# Patient Record
Sex: MALE | Race: WHITE | NOT HISPANIC OR LATINO | Employment: FULL TIME | URBAN - METROPOLITAN AREA
[De-identification: names, ages, dates, MRNs, and addresses within clinical notes are randomized per-mention and may not be internally consistent; named-entity substitution may affect disease eponyms.]

---

## 2022-10-07 ENCOUNTER — TELEMEDICINE (OUTPATIENT)
Dept: FAMILY MEDICINE CLINIC | Facility: TELEHEALTH | Age: 42
End: 2022-10-07

## 2022-10-07 DIAGNOSIS — U07.1 COVID-19: Primary | ICD-10-CM

## 2022-10-07 PROCEDURE — BRIGHTMDVISIT: Performed by: NURSE PRACTITIONER

## 2022-10-07 RX ORDER — LIDOCAINE HYDROCHLORIDE 20 MG/ML
10 SOLUTION OROPHARYNGEAL AS NEEDED
Qty: 100 ML | Refills: 0 | Status: SHIPPED | OUTPATIENT
Start: 2022-10-07 | End: 2022-10-07 | Stop reason: SDUPTHER

## 2022-10-07 RX ORDER — LIDOCAINE HYDROCHLORIDE 20 MG/ML
10 SOLUTION OROPHARYNGEAL 4 TIMES DAILY PRN
Qty: 100 ML | Refills: 0 | Status: SHIPPED | OUTPATIENT
Start: 2022-10-07

## 2022-10-07 RX ORDER — PREDNISONE 20 MG/1
20 TABLET ORAL DAILY
Qty: 3 TABLET | Refills: 0 | Status: SHIPPED | OUTPATIENT
Start: 2022-10-07 | End: 2022-10-10

## 2022-10-07 NOTE — PROGRESS NOTES
CHIEF COMPLAINT  Chief Complaint   Patient presents with   • Sore Throat         HPI  Justin San is a 42 y.o. male  presents with complaint of sore throat. He has gotten worse over the last several days. 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   He has a sore throat since Monday. He did have low grade fever.   I have ask him to take an at home COVID-19 test before treatment.     Review of Systems   Constitutional: Positive for fever (100.5, but typically ). Negative for chills, diaphoresis and fatigue.   HENT: Positive for sore throat. Negative for congestion, postnasal drip, rhinorrhea, sinus pressure, sinus pain and sneezing.    Respiratory: Negative for cough, chest tightness, shortness of breath and wheezing.    Cardiovascular: Negative for chest pain.   Gastrointestinal: Negative for diarrhea, nausea and vomiting.   Musculoskeletal: Positive for myalgias.   Neurological: Negative for headaches.       History reviewed. No pertinent past medical history.    No family history on file.    Social History     Socioeconomic History   • Marital status:    Tobacco Use   • Smoking status: Never Smoker   • Smokeless tobacco: Never Used   Vaping Use   • Vaping Use: Never used       Justin San  reports that he has never smoked. He has never used smokeless tobacco.       There were no vitals taken for this visit.    PHYSICAL EXAM  Physical Exam   Constitutional: He is oriented to person, place, and time. He appears well-developed and well-nourished. He does not have a sickly appearance. He does not appear ill. No distress.   HENT:   Head: Normocephalic and atraumatic.   Very red, but no white pustules.    Eyes: EOM are normal.   Pulmonary/Chest: Effort normal.  No respiratory distress.  Lymphadenopathy:     He has cervical adenopathy (per patient report ).   Neurological: He is alert and oriented to person, place, and time.   Skin: Skin is dry.   Psychiatric: He has a normal mood and  affect.         Diagnoses and all orders for this visit:    1. COVID-19 (Primary)  -     QUESTIONNAIRE SERIES    Other orders  -     Lidocaine Viscous HCl (XYLOCAINE) 2 % solution; Take 10 mL by mouth As Needed for Mild Pain.  Dispense: 100 mL; Refill: 0  -     predniSONE (DELTASONE) 20 MG tablet; Take 1 tablet by mouth Daily for 3 days.  Dispense: 3 tablet; Refill: 0    COVID-19 test positive.     The use of a video visit has been reviewed with the patient and verbal informd consent has een obtained. Myself and Justin San participated in this visit. The patient is located in [address] [place] [address]. I am located in [place], [place]. Mychart and Zoom were utilized.       Note Disclaimer: At [facility], we believe that sharing information builds trust and better   relationships. You are receiving this note because you recently visited [facility]. It is possible you   will see health information before a provider has talked with you about it. This kind of information can   be easy to misunderstand. To help you fully understand what it means for your health, we urge you to   discuss this note with your provider.    Manju Reynoso, UZIEL  10/07/2022  10:40 EDT

## 2022-10-07 NOTE — PATIENT INSTRUCTIONS
You will need to remain quarantined for 10 days from onset of symptoms and only to discontinue if symptoms have improved and no fever for 24 hours without the use of fever reducing medications such as Tylenol (acetaminophen), Advil (ibuprfen), or Aleve (naproxen).     You may discontinue after 5 days if your symptoms have resolved and you have no fever for 24 hours without the use of fever reducing medications such as Tylenol (acetaminophen), Advil (ibuprfen), or Aleve (naproxen).   Continue to wear a mask for 10 days or until symptoms resolve. If symptoms worsen, go to Urgent Care or the Emergency Department for care.     Symptoms of Coronavirus are treated just as you would for any viral illness. Take Tylenol and/or Ibuprofen for pain and/or fever, you may find it better to alternate these every 4 hours, so that you are only taking each every 8 hours. Stay hydrated, rest and you may treat cough with over-the-counter cough syrup such as Robitussin.  If your symptoms do not improve, symptoms change or do not fully resolve, seek further evaluation with your primary care provider or Urgent Care.     If you develop severe symptoms, such as chest pain, high fever, difficulty breathing, difficulty urinating, confusion, difficulty staying awake, blue or pale lips or have any symptoms that interfere with your ability to function go to the nearest Emergency Department immediately.

## 2024-08-23 ENCOUNTER — HOSPITAL ENCOUNTER (INPATIENT)
Facility: HOSPITAL | Age: 44
LOS: 3 days | Discharge: HOME | End: 2024-08-26
Attending: EMERGENCY MEDICINE | Admitting: STUDENT IN AN ORGANIZED HEALTH CARE EDUCATION/TRAINING PROGRAM

## 2024-08-23 DIAGNOSIS — E87.1 HYPONATREMIA: Primary | ICD-10-CM

## 2024-08-23 DIAGNOSIS — M62.82 NON-TRAUMATIC RHABDOMYOLYSIS: ICD-10-CM

## 2024-08-23 LAB
ALBUMIN SERPL-MCNC: 4.9 G/DL (ref 3.5–5.2)
ALBUMIN/GLOB SERPL: 2.3 G/DL
ALP SERPL-CCNC: 58 U/L (ref 39–117)
ALT SERPL W P-5'-P-CCNC: 23 U/L (ref 1–41)
ANION GAP SERPL CALCULATED.3IONS-SCNC: 17.5 MMOL/L (ref 5–15)
AST SERPL-CCNC: 59 U/L (ref 1–40)
BASOPHILS # BLD AUTO: 0.02 10*3/MM3 (ref 0–0.2)
BASOPHILS NFR BLD AUTO: 0.1 % (ref 0–1.5)
BILIRUB SERPL-MCNC: 1.3 MG/DL (ref 0–1.2)
BUN SERPL-MCNC: 2 MG/DL (ref 6–20)
BUN/CREAT SERPL: 2.1 (ref 7–25)
CALCIUM SPEC-SCNC: 8.9 MG/DL (ref 8.6–10.5)
CHLORIDE SERPL-SCNC: 84 MMOL/L (ref 98–107)
CK SERPL-CCNC: 2906 U/L (ref 20–200)
CO2 SERPL-SCNC: 16.5 MMOL/L (ref 22–29)
CREAT SERPL-MCNC: 0.94 MG/DL (ref 0.76–1.27)
DEPRECATED RDW RBC AUTO: 40.6 FL (ref 37–54)
EGFRCR SERPLBLD CKD-EPI 2021: 103.8 ML/MIN/1.73
EOSINOPHIL # BLD AUTO: 0.01 10*3/MM3 (ref 0–0.4)
EOSINOPHIL NFR BLD AUTO: 0.1 % (ref 0.3–6.2)
ERYTHROCYTE [DISTWIDTH] IN BLOOD BY AUTOMATED COUNT: 12.4 % (ref 12.3–15.4)
GLOBULIN UR ELPH-MCNC: 2.1 GM/DL
GLUCOSE SERPL-MCNC: 156 MG/DL (ref 65–99)
HCT VFR BLD AUTO: 41.1 % (ref 37.5–51)
HGB BLD-MCNC: 14 G/DL (ref 13–17.7)
IMM GRANULOCYTES # BLD AUTO: 0.07 10*3/MM3 (ref 0–0.05)
IMM GRANULOCYTES NFR BLD AUTO: 0.4 % (ref 0–0.5)
LYMPHOCYTES # BLD AUTO: 0.6 10*3/MM3 (ref 0.7–3.1)
LYMPHOCYTES NFR BLD AUTO: 3.9 % (ref 19.6–45.3)
MCH RBC QN AUTO: 30.8 PG (ref 26.6–33)
MCHC RBC AUTO-ENTMCNC: 34.1 G/DL (ref 31.5–35.7)
MCV RBC AUTO: 90.5 FL (ref 79–97)
MONOCYTES # BLD AUTO: 0.6 10*3/MM3 (ref 0.1–0.9)
MONOCYTES NFR BLD AUTO: 3.9 % (ref 5–12)
NEUTROPHILS NFR BLD AUTO: 14.28 10*3/MM3 (ref 1.7–7)
NEUTROPHILS NFR BLD AUTO: 91.6 % (ref 42.7–76)
NRBC BLD AUTO-RTO: 0 /100 WBC (ref 0–0.2)
PLATELET # BLD AUTO: 189 10*3/MM3 (ref 140–450)
PMV BLD AUTO: 10.2 FL (ref 6–12)
POTASSIUM SERPL-SCNC: 4 MMOL/L (ref 3.5–5.2)
PROT SERPL-MCNC: 7 G/DL (ref 6–8.5)
RBC # BLD AUTO: 4.54 10*6/MM3 (ref 4.14–5.8)
SODIUM SERPL-SCNC: 118 MMOL/L (ref 136–145)
WBC NRBC COR # BLD AUTO: 15.58 10*3/MM3 (ref 3.4–10.8)

## 2024-08-23 PROCEDURE — 25810000003 SODIUM CHLORIDE 0.9 % SOLUTION: Performed by: EMERGENCY MEDICINE

## 2024-08-23 PROCEDURE — 93005 ELECTROCARDIOGRAM TRACING: CPT | Performed by: EMERGENCY MEDICINE

## 2024-08-23 PROCEDURE — 85025 COMPLETE CBC W/AUTO DIFF WBC: CPT | Performed by: EMERGENCY MEDICINE

## 2024-08-23 PROCEDURE — 82550 ASSAY OF CK (CPK): CPT | Performed by: EMERGENCY MEDICINE

## 2024-08-23 PROCEDURE — 99291 CRITICAL CARE FIRST HOUR: CPT

## 2024-08-23 PROCEDURE — 93010 ELECTROCARDIOGRAM REPORT: CPT | Performed by: INTERNAL MEDICINE

## 2024-08-23 PROCEDURE — 80053 COMPREHEN METABOLIC PANEL: CPT | Performed by: EMERGENCY MEDICINE

## 2024-08-23 RX ORDER — SODIUM CHLORIDE 0.9 % (FLUSH) 0.9 %
10 SYRINGE (ML) INJECTION AS NEEDED
Status: DISCONTINUED | OUTPATIENT
Start: 2024-08-23 | End: 2024-08-26 | Stop reason: HOSPADM

## 2024-08-23 RX ORDER — ONDANSETRON 2 MG/ML
4 INJECTION INTRAMUSCULAR; INTRAVENOUS EVERY 6 HOURS PRN
Status: DISCONTINUED | OUTPATIENT
Start: 2024-08-23 | End: 2024-08-26 | Stop reason: HOSPADM

## 2024-08-23 RX ORDER — AMOXICILLIN 250 MG
2 CAPSULE ORAL 2 TIMES DAILY PRN
Status: DISCONTINUED | OUTPATIENT
Start: 2024-08-23 | End: 2024-08-26 | Stop reason: HOSPADM

## 2024-08-23 RX ORDER — BISACODYL 10 MG
10 SUPPOSITORY, RECTAL RECTAL DAILY PRN
Status: DISCONTINUED | OUTPATIENT
Start: 2024-08-23 | End: 2024-08-26 | Stop reason: HOSPADM

## 2024-08-23 RX ORDER — BISACODYL 5 MG/1
5 TABLET, DELAYED RELEASE ORAL DAILY PRN
Status: DISCONTINUED | OUTPATIENT
Start: 2024-08-23 | End: 2024-08-26 | Stop reason: HOSPADM

## 2024-08-23 RX ORDER — ACETAMINOPHEN 160 MG/5ML
650 SOLUTION ORAL EVERY 4 HOURS PRN
Status: DISCONTINUED | OUTPATIENT
Start: 2024-08-23 | End: 2024-08-26 | Stop reason: HOSPADM

## 2024-08-23 RX ORDER — ACETAMINOPHEN 650 MG/1
650 SUPPOSITORY RECTAL EVERY 4 HOURS PRN
Status: DISCONTINUED | OUTPATIENT
Start: 2024-08-23 | End: 2024-08-26 | Stop reason: HOSPADM

## 2024-08-23 RX ORDER — POLYETHYLENE GLYCOL 3350 17 G/17G
17 POWDER, FOR SOLUTION ORAL DAILY PRN
Status: DISCONTINUED | OUTPATIENT
Start: 2024-08-23 | End: 2024-08-26 | Stop reason: HOSPADM

## 2024-08-23 RX ORDER — ONDANSETRON 4 MG/1
4 TABLET, ORALLY DISINTEGRATING ORAL EVERY 6 HOURS PRN
Status: DISCONTINUED | OUTPATIENT
Start: 2024-08-23 | End: 2024-08-26 | Stop reason: HOSPADM

## 2024-08-23 RX ORDER — ALUMINA, MAGNESIA, AND SIMETHICONE 2400; 2400; 240 MG/30ML; MG/30ML; MG/30ML
15 SUSPENSION ORAL EVERY 6 HOURS PRN
Status: DISCONTINUED | OUTPATIENT
Start: 2024-08-23 | End: 2024-08-26 | Stop reason: HOSPADM

## 2024-08-23 RX ORDER — NITROGLYCERIN 0.4 MG/1
0.4 TABLET SUBLINGUAL
Status: DISCONTINUED | OUTPATIENT
Start: 2024-08-23 | End: 2024-08-26 | Stop reason: HOSPADM

## 2024-08-23 RX ORDER — ACETAMINOPHEN 325 MG/1
650 TABLET ORAL EVERY 4 HOURS PRN
Status: DISCONTINUED | OUTPATIENT
Start: 2024-08-23 | End: 2024-08-26 | Stop reason: HOSPADM

## 2024-08-23 RX ADMIN — SODIUM CHLORIDE 1000 ML: 9 INJECTION, SOLUTION INTRAVENOUS at 22:05

## 2024-08-24 PROBLEM — R73.9 HYPERGLYCEMIA: Status: ACTIVE | Noted: 2024-08-24

## 2024-08-24 PROBLEM — M79.10 MYALGIA: Status: ACTIVE | Noted: 2024-08-24

## 2024-08-24 PROBLEM — M62.82 RHABDOMYOLYSIS: Status: ACTIVE | Noted: 2024-08-24

## 2024-08-24 PROBLEM — D64.9 ANEMIA: Status: ACTIVE | Noted: 2024-08-24

## 2024-08-24 PROBLEM — R74.8 ELEVATED CK: Status: ACTIVE | Noted: 2024-08-24

## 2024-08-24 PROBLEM — E86.0 DEHYDRATION: Status: ACTIVE | Noted: 2024-08-24

## 2024-08-24 PROBLEM — D72.829 LEUKOCYTOSIS: Status: ACTIVE | Noted: 2024-08-24

## 2024-08-24 PROBLEM — R53.1 GENERALIZED WEAKNESS: Status: ACTIVE | Noted: 2024-08-24

## 2024-08-24 LAB
ANION GAP SERPL CALCULATED.3IONS-SCNC: 11 MMOL/L (ref 5–15)
ANION GAP SERPL CALCULATED.3IONS-SCNC: 12.3 MMOL/L (ref 5–15)
ANION GAP SERPL CALCULATED.3IONS-SCNC: 6.4 MMOL/L (ref 5–15)
BILIRUB UR QL STRIP: NEGATIVE
BUN SERPL-MCNC: 10 MG/DL (ref 6–20)
BUN SERPL-MCNC: 11 MG/DL (ref 6–20)
BUN SERPL-MCNC: 12 MG/DL (ref 6–20)
BUN/CREAT SERPL: 13.5 (ref 7–25)
BUN/CREAT SERPL: 15 (ref 7–25)
BUN/CREAT SERPL: 15.5 (ref 7–25)
CALCIUM SPEC-SCNC: 8.4 MG/DL (ref 8.6–10.5)
CALCIUM SPEC-SCNC: 8.9 MG/DL (ref 8.6–10.5)
CALCIUM SPEC-SCNC: 9 MG/DL (ref 8.6–10.5)
CHLORIDE SERPL-SCNC: 106 MMOL/L (ref 98–107)
CHLORIDE SERPL-SCNC: 90 MMOL/L (ref 98–107)
CHLORIDE SERPL-SCNC: 99 MMOL/L (ref 98–107)
CK SERPL-CCNC: 3157 U/L (ref 20–200)
CLARITY UR: CLEAR
CO2 SERPL-SCNC: 18.7 MMOL/L (ref 22–29)
CO2 SERPL-SCNC: 21 MMOL/L (ref 22–29)
CO2 SERPL-SCNC: 22.6 MMOL/L (ref 22–29)
COLOR UR: YELLOW
CREAT SERPL-MCNC: 0.71 MG/DL (ref 0.76–1.27)
CREAT SERPL-MCNC: 0.74 MG/DL (ref 0.76–1.27)
CREAT SERPL-MCNC: 0.8 MG/DL (ref 0.76–1.27)
CREAT UR-MCNC: 40.7 MG/DL
D-LACTATE SERPL-SCNC: 0.8 MMOL/L (ref 0.5–2)
DEPRECATED RDW RBC AUTO: 39.3 FL (ref 37–54)
EGFRCR SERPLBLD CKD-EPI 2021: 113.3 ML/MIN/1.73
EGFRCR SERPLBLD CKD-EPI 2021: 116 ML/MIN/1.73
EGFRCR SERPLBLD CKD-EPI 2021: 117.5 ML/MIN/1.73
ERYTHROCYTE [DISTWIDTH] IN BLOOD BY AUTOMATED COUNT: 12.3 % (ref 12.3–15.4)
GLUCOSE SERPL-MCNC: 103 MG/DL (ref 65–99)
GLUCOSE SERPL-MCNC: 106 MG/DL (ref 65–99)
GLUCOSE SERPL-MCNC: 108 MG/DL (ref 65–99)
GLUCOSE UR STRIP-MCNC: NEGATIVE MG/DL
HCT VFR BLD AUTO: 36.3 % (ref 37.5–51)
HGB BLD-MCNC: 12.5 G/DL (ref 13–17.7)
HGB UR QL STRIP.AUTO: NEGATIVE
KETONES UR QL STRIP: ABNORMAL
LEUKOCYTE ESTERASE UR QL STRIP.AUTO: NEGATIVE
MCH RBC QN AUTO: 30.3 PG (ref 26.6–33)
MCHC RBC AUTO-ENTMCNC: 34.4 G/DL (ref 31.5–35.7)
MCV RBC AUTO: 87.9 FL (ref 79–97)
NITRITE UR QL STRIP: NEGATIVE
OSMOLALITY SERPL: 249 MOSM/KG (ref 275–300)
OSMOLALITY UR: 348 MOSM/KG
PH UR STRIP.AUTO: 6 [PH] (ref 5–8)
PLATELET # BLD AUTO: 168 10*3/MM3 (ref 140–450)
PMV BLD AUTO: 10.1 FL (ref 6–12)
POTASSIUM SERPL-SCNC: 3.9 MMOL/L (ref 3.5–5.2)
POTASSIUM SERPL-SCNC: 4.2 MMOL/L (ref 3.5–5.2)
POTASSIUM SERPL-SCNC: 4.5 MMOL/L (ref 3.5–5.2)
PROT UR QL STRIP: NEGATIVE
QT INTERVAL: 347 MS
QT INTERVAL: 403 MS
QTC INTERVAL: 434 MS
QTC INTERVAL: 457 MS
RBC # BLD AUTO: 4.13 10*6/MM3 (ref 4.14–5.8)
SODIUM SERPL-SCNC: 121 MMOL/L (ref 136–145)
SODIUM SERPL-SCNC: 131 MMOL/L (ref 136–145)
SODIUM SERPL-SCNC: 135 MMOL/L (ref 136–145)
SODIUM UR-SCNC: 36 MMOL/L
SP GR UR STRIP: 1.01 (ref 1–1.03)
TSH SERPL DL<=0.05 MIU/L-ACNC: 1.35 UIU/ML (ref 0.27–4.2)
URATE SERPL-MCNC: 5.3 MG/DL (ref 3.4–7)
UROBILINOGEN UR QL STRIP: ABNORMAL
WBC NRBC COR # BLD AUTO: 10.07 10*3/MM3 (ref 3.4–10.8)

## 2024-08-24 PROCEDURE — 85027 COMPLETE CBC AUTOMATED: CPT | Performed by: NURSE PRACTITIONER

## 2024-08-24 PROCEDURE — 83935 ASSAY OF URINE OSMOLALITY: CPT | Performed by: NURSE PRACTITIONER

## 2024-08-24 PROCEDURE — 0 DEXTROSE 5 % SOLUTION: Performed by: INTERNAL MEDICINE

## 2024-08-24 PROCEDURE — 83605 ASSAY OF LACTIC ACID: CPT | Performed by: INTERNAL MEDICINE

## 2024-08-24 PROCEDURE — 97161 PT EVAL LOW COMPLEX 20 MIN: CPT

## 2024-08-24 PROCEDURE — 25810000003 SODIUM CHLORIDE 0.9 % SOLUTION: Performed by: NURSE PRACTITIONER

## 2024-08-24 PROCEDURE — 80048 BASIC METABOLIC PNL TOTAL CA: CPT | Performed by: INTERNAL MEDICINE

## 2024-08-24 PROCEDURE — 83930 ASSAY OF BLOOD OSMOLALITY: CPT | Performed by: NURSE PRACTITIONER

## 2024-08-24 PROCEDURE — 36415 COLL VENOUS BLD VENIPUNCTURE: CPT | Performed by: NURSE PRACTITIONER

## 2024-08-24 PROCEDURE — 93010 ELECTROCARDIOGRAM REPORT: CPT | Performed by: INTERNAL MEDICINE

## 2024-08-24 PROCEDURE — 25810000003 SODIUM CHLORIDE 0.9 % SOLUTION: Performed by: INTERNAL MEDICINE

## 2024-08-24 PROCEDURE — 81003 URINALYSIS AUTO W/O SCOPE: CPT | Performed by: NURSE PRACTITIONER

## 2024-08-24 PROCEDURE — 82550 ASSAY OF CK (CPK): CPT | Performed by: NURSE PRACTITIONER

## 2024-08-24 PROCEDURE — 84300 ASSAY OF URINE SODIUM: CPT | Performed by: NURSE PRACTITIONER

## 2024-08-24 PROCEDURE — 84443 ASSAY THYROID STIM HORMONE: CPT | Performed by: NURSE PRACTITIONER

## 2024-08-24 PROCEDURE — 80048 BASIC METABOLIC PNL TOTAL CA: CPT | Performed by: NURSE PRACTITIONER

## 2024-08-24 PROCEDURE — 84550 ASSAY OF BLOOD/URIC ACID: CPT | Performed by: NURSE PRACTITIONER

## 2024-08-24 PROCEDURE — 82570 ASSAY OF URINE CREATININE: CPT | Performed by: NURSE PRACTITIONER

## 2024-08-24 PROCEDURE — 93005 ELECTROCARDIOGRAM TRACING: CPT | Performed by: STUDENT IN AN ORGANIZED HEALTH CARE EDUCATION/TRAINING PROGRAM

## 2024-08-24 RX ORDER — DEXTROSE MONOHYDRATE 50 MG/ML
150 INJECTION, SOLUTION INTRAVENOUS CONTINUOUS
Status: DISCONTINUED | OUTPATIENT
Start: 2024-08-24 | End: 2024-08-25

## 2024-08-24 RX ORDER — SODIUM CHLORIDE 9 MG/ML
125 INJECTION, SOLUTION INTRAVENOUS CONTINUOUS
Status: DISCONTINUED | OUTPATIENT
Start: 2024-08-24 | End: 2024-08-24

## 2024-08-24 RX ORDER — DESMOPRESSIN ACETATE 4 UG/ML
2 INJECTION, SOLUTION INTRAVENOUS; SUBCUTANEOUS ONCE
Status: COMPLETED | OUTPATIENT
Start: 2024-08-25 | End: 2024-08-25

## 2024-08-24 RX ADMIN — DEXTROSE MONOHYDRATE 150 ML/HR: 50 INJECTION, SOLUTION INTRAVENOUS at 18:38

## 2024-08-24 RX ADMIN — ACETAMINOPHEN 325MG 650 MG: 325 TABLET ORAL at 17:22

## 2024-08-24 RX ADMIN — SODIUM CHLORIDE 125 ML/HR: 9 INJECTION, SOLUTION INTRAVENOUS at 17:19

## 2024-08-24 RX ADMIN — SODIUM CHLORIDE 75 ML/HR: 9 INJECTION, SOLUTION INTRAVENOUS at 06:05

## 2024-08-24 NOTE — CONSULTS
[facility] Consult Note      Patient Name: Justin San  : 1980  MRN: [id number]  Primary Care Physician:  Provider, No Known  Referring Physician: Alvin HUBBARD MD  Date of admission: 2024    Subjective     Reason for Consult:  hyponatremia    HPI:   Justin San is a 44 y.o. male with presented last night with gen weakness, fatigue, myalgias.  He spent much of yesterday working outside in yard (was shoveling large amounts of dirt around for about 4 hours).  Became lightheaded.  No nv or diarrhea . Found to be severely hyponatremic with Na 118 (at 10PM).  Also acidotic with bicarb 16, AG 18.  Cr normal 0.9 mg/dL but BUN strikingly low (2).  CK elevated ~ 3K.  Urine sodium was 36 and urine osm 348.  WBC elevated to 15K.  UA showed ketonuria, but no e/o myoglobinuria.  BG mildly elevated 156.  TSH and uric acid normal (latter 5.3).  With IVF, Na up slightly 121, though CK up slightly 3157.  AST minimally elevated 59.  Normotensive thus far.  Does not take meds at home.  He apparently was going inside frequently to drink a glass of water while working.  Had some nausea but no emesis or diarrhea.      Review of Systems:   14 point review of systems is otherwise negative except for mentioned above on HPI    Personal History     History reviewed. No pertinent past medical history.    Past Surgical History:   Procedure Laterality Date    TONSILLECTOMY         Family History: family history is not on file.    Social History:  reports that he has never smoked. He has never used smokeless tobacco. He reports that he does not drink alcohol and does not use drugs.    Home Medications:  Prior to Admission medications    Medication Sig Start Date End Date Taking? Authorizing Provider   Lidocaine Viscous HCl (XYLOCAINE) 2 % solution Take 10 mL by mouth 4 (Four) Times a Day As Needed for Mild Pain. 10/7/22   Manju Reynoso APRN       Allergies:  No Known  Allergies    Objective     Vitals:   Temp:  [97.7 °F (36.5 °C)-99 °F (37.2 °C)] 98.2 °F (36.8 °C)  Heart Rate:  [] 73  Resp:  [16-18] 16  BP: (109-128)/(67-77) 125/76  No intake or output data in the 24 hours ending 08/24/24 1323    Physical Exam:    General Appearance: alert, oriented x 3, no acute distress   Skin: warm and dry  HEENT: oral mucosa normal, nonicteric sclera  Neck: supple, no JVD  Lungs: CTA  Heart: RRR, normal S1 and S2  Abdomen: soft, nontender, nondistended  : no palpable bladder  Extremities: no edema, cyanosis or clubbing  Neuro: normal speech and mental status     Scheduled Meds:        IV Meds:   sodium chloride, 150 mL/hr, Last Rate: 75 mL/hr (08/24/24 0605)        Results Reviewed:   I have personally reviewed the results from the time of this admission to 8/24/2024 13:23 EDT     Lab Results   Component Value Date    GLUCOSE 106 (H) 08/24/2024    CALCIUM 8.4 (L) 08/24/2024     (L) 08/24/2024    K 3.9 08/24/2024    CO2 18.7 (L) 08/24/2024    CL 90 (L) 08/24/2024    BUN 11 08/24/2024    CREATININE 0.71 (L) 08/24/2024    BCR 15.5 08/24/2024    ANIONGAP 12.3 08/24/2024      Lab Results   Component Value Date    ALBUMIN 4.9 08/23/2024           Assessment / Plan     ASSESSMENT:  Hyponatremia - likely hypovolemic (but at same time was drinking large amounts of water while working outside in heat).  Na 118 -> 121 over 8h with IVF.  Need to make sure we're not over-correcting this, aim to keep Na < 126 by 10PM tonight.  Urine studies somewhat indeterminate ie Na 36 and osm 348 (urine Na not low as expected).    Elevated CK - moderate (~ 3K), related to intense yard work; renal fcn preserved (though BUN oddly low 2 initially -> 11 now)  AG metabolic acidosis - ketonuria due to vol depletion; bicarb 19 still but AG has closed   Leukocytosis - likely reactive, WBC improved 15 to 10K  Hyperglycemia, mild,  -> 106 now    PLAN:  Recheck BMP stat to assure not over correcting Na and  adjust IVF accordingly to keep Na < 126 by 10PM   Repeat BMP again ~ 8 PM  Check lactic acid   Trend CK  D/W RN    Thank you for involving us in the care of Justin San.  Please feel free to call with any questions.    Eleazar Franks MD  08/24/24  13:23 EDT    [facility]  [phone]

## 2024-08-24 NOTE — ED NOTES
"Nursing report ED to floor  Justin San  44 y.o.  male    HPI :  HPI (Adult)  Stated Reason for Visit: heat exposure, gen weakness, headache  History Obtained From: patient    Chief Complaint  Chief Complaint   Patient presents with    Weakness - Generalized    Headache    Heat Exposure       Admitting doctor:   Charly Black MD    Admitting diagnosis:   The primary encounter diagnosis was Hyponatremia. A diagnosis of Non-traumatic rhabdomyolysis was also pertinent to this visit.    Code status:   Current Code Status       Date Active Code Status Order ID Comments User Context       8/23/2024 2350 CPR (Attempt to Resuscitate) [id number]  Lucia Acosta, UZIEL ED        Question Answer    Code Status (Patient has no pulse and is not breathing) CPR (Attempt to Resuscitate)    Medical Interventions (Patient has pulse or is breathing) Full Support                    Allergies:   Patient has no known allergies.    Isolation:   No active isolations    Intake and Output  No intake or output data in the 24 hours ending 08/24/24 0029    Weight:       08/23/24 2109   Weight: 65.8 kg (145 lb)       Most recent vitals:   Vitals:    08/23/24 2109 08/23/24 2111 08/23/24 2153 08/23/24 2203   BP:  123/77 117/72 128/73   BP Location:  Right arm  Right arm   Patient Position:  Sitting  Lying   Pulse: 108  90 95   Resp: 16   18   Temp: 97.7 °F (36.5 °C)      SpO2: 100%  100% 100%   Weight: 65.8 kg (145 lb)      Height: 170.2 cm (67\")          Active LDAs/IV Access:   Lines, Drains & Airways       Active LDAs       Name Placement date Placement time Site Days    Peripheral IV 08/23/24 2205 Left Antecubital 08/23/24 2205  Antecubital  less than 1                    Labs (abnormal labs have a star):   Labs Reviewed   COMPREHENSIVE METABOLIC PANEL - Abnormal; Notable for the following components:       Result Value    Glucose 156 (*)     BUN 2 (*)     Sodium 118 (*)     Chloride 84 (*)     CO2 16.5 (*)     AST " (SGOT) 59 (*)     Total Bilirubin 1.3 (*)     BUN/Creatinine Ratio 2.1 (*)     Anion Gap 17.5 (*)     All other components within normal limits    Narrative:     GFR Normal >60  Chronic Kidney Disease <60  Kidney Failure <15     CK - Abnormal; Notable for the following components:    Creatine Kinase 2,906 (*)     All other components within normal limits   CBC WITH AUTO DIFFERENTIAL - Abnormal; Notable for the following components:    WBC 15.58 (*)     Neutrophil % 91.6 (*)     Lymphocyte % 3.9 (*)     Monocyte % 3.9 (*)     Eosinophil % 0.1 (*)     Neutrophils, Absolute 14.28 (*)     Lymphocytes, Absolute 0.60 (*)     Immature Grans, Absolute 0.07 (*)     All other components within normal limits   URINALYSIS W/ MICROSCOPIC IF INDICATED (NO CULTURE)   SODIUM, URINE, RANDOM   CREATININE URINE RANDOM (KIDNEY FUNCTION) GFR COMPONENT   OSMOLALITY, URINE   OSMOLALITY, SERUM   URIC ACID   TSH   CBC AND DIFFERENTIAL    Narrative:     The following orders were created for panel order CBC & Differential.  Procedure                               Abnormality         Status                     ---------                               -----------         ------                     CBC Auto Differential[[id number]]        Abnormal            Final result                 Please view results for these tests on the individual orders.       EKG:   ECG 12 Lead Syncope   Preliminary Result   HEART RATE=94  bpm   RR Odwizont=782  ms   OR Interval=  ms   P Horizontal Axis=  deg   P Front Axis=  deg   QRSD Interval=85  ms   QT Wpnpjehu=140  ms   XTbU=680  ms   QRS Axis=73  deg   T Wave Axis=40  deg   - ABNORMAL ECG -   Atrial flutter with varied AV block,   RSR' in V1 or V2, right VCD or RVH   Date and Time of Study:2024-08-23 22:08:05          Meds given in ED:   Medications   sodium chloride 0.9 % flush 10 mL (has no administration in time range)   nitroglycerin (NITROSTAT) SL tablet 0.4 mg (has no administration in time range)    acetaminophen (TYLENOL) tablet 650 mg (has no administration in time range)     Or   acetaminophen (TYLENOL) 160 MG/5ML oral solution 650 mg (has no administration in time range)     Or   acetaminophen (TYLENOL) suppository 650 mg (has no administration in time range)   sennosides-docusate (PERICOLACE) 8.6-50 MG per tablet 2 tablet (has no administration in time range)     And   polyethylene glycol (MIRALAX) packet 17 g (has no administration in time range)     And   bisacodyl (DULCOLAX) EC tablet 5 mg (has no administration in time range)     And   bisacodyl (DULCOLAX) suppository 10 mg (has no administration in time range)   ondansetron ODT (ZOFRAN-ODT) disintegrating tablet 4 mg (has no administration in time range)     Or   ondansetron (ZOFRAN) injection 4 mg (has no administration in time range)   aluminum-magnesium hydroxide-simethicone (MAALOX MAX) 400-400-40 MG/5ML suspension 15 mL (has no administration in time range)   sodium chloride 0.9 % bolus 1,000 mL (1,000 mL Intravenous New Bag 8/23/24 3993)       Imaging results:  No radiology results for the last day    Ambulatory status:   - standby     Social issues:   Social History     Socioeconomic History    Marital status:    Tobacco Use    Smoking status: Never    Smokeless tobacco: Never   Vaping Use    Vaping status: Never Used       Peripheral Neurovascular  Peripheral Neurovascular (Adult)  Peripheral Neurovascular WDL: .WDL except, neurovascular assessment upper, neurovascular assessment lower  LUE Neurovascular Assessment  Temperature LUE: cool  Color LUE: no discoloration  Sensation LUE: tingling present, no numbness, no tenderness  RUE Neurovascular Assessment  Temperature RUE: cool  Color RUE: no discoloration  Sensation RUE: tingling present, no numbness, no tenderness  LLE Neurovascular Assessment  Temperature LLE: cool  Color LLE: no discoloration  Sensation LLE: no numbness, no tenderness, tingling present  RLE Neurovascular  Assessment  Temperature RLE: cool  Color RLE: no discoloration  Sensation RLE: no numbness, no tenderness, tingling present    Neuro Cognitive  Neuro Cognitive (Adult)  Cognitive/Neuro/Behavioral WDL: WDL  Pupils  Pupil PERRLA: yes    Learning  Learning Assessment (Adult)  Learning Readiness and Ability: no barriers identified  Education Provided  Person Taught: patient, spouse  Teaching Method: verbal instruction  Teaching Focus: symptom/problem overview  Education Outcome Evaluation: eager to learn    Respiratory  Respiratory WDL  Respiratory WDL: WDL    Abdominal Pain       Pain Assessments  Pain (Adult)  (0-10) Pain Rating: Rest: 3    NIH Stroke Scale       Christianne Brizuela RN  08/24/24 00:29 EDT

## 2024-08-24 NOTE — ED PROVIDER NOTES
EMERGENCY DEPARTMENT ENCOUNTER    Room Number:  S421/1  PCP: Provider, No Known  Patient Care Team:  Provider, No Known as PCP - General   Independent Historians: Patient    HPI:  Chief Complaint: Dehydration    A complete HPI/ROS/PMH/PSH/SH/FH are unobtainable due to: None    Chronic or social conditions impacting patient care (Social Determinants of Health): None  (Financial Resource Strain / Food Insecurity / Transportation Needs / Physical Activity / Stress / Social Connections / Intimate Partner Violence / Housing Stability)    Context: Justin San is a 44 y.o. male who presents to the ED c/o acute dehydration.  Patient states that he was working out in the sun for over 6 hours today states he drank a lot of water.  States that he has had a little bit of a headache.  As well as some muscle cramping.  No vomiting but nausea has been present.  No abdominal pain no chest pain or shortness of breath.    Review of prior external notes (non-ED) -and- Review of prior external test results outside of this encounter: Reviewed patient's primary care note from 2/9/2020    Prescription drug monitoring program review:         PAST MEDICAL HISTORY  Active Ambulatory Problems     Diagnosis Date Noted    No Active Ambulatory Problems     Resolved Ambulatory Problems     Diagnosis Date Noted    No Resolved Ambulatory Problems     No Additional Past Medical History         PAST SURGICAL HISTORY  Past Surgical History:   Procedure Laterality Date    TONSILLECTOMY           FAMILY HISTORY  History reviewed. No pertinent family history.      SOCIAL HISTORY  Social History     Socioeconomic History    Marital status:    Tobacco Use    Smoking status: Never    Smokeless tobacco: Never   Vaping Use    Vaping status: Never Used   Substance and Sexual Activity    Alcohol use: Never    Drug use: Never    Sexual activity: Defer         ALLERGIES  Patient has no known allergies.        REVIEW OF SYSTEMS  Review of  Systems  Included in HPI  All systems reviewed and negative except for those discussed in HPI.      PHYSICAL EXAM    I have reviewed the triage vital signs and nursing notes.    ED Triage Vitals   Temp Heart Rate Resp BP SpO2   08/23/24 2109 08/23/24 2109 08/23/24 2109 08/23/24 2111 08/23/24 2109   97.7 °F (36.5 °C) 108 16 123/77 100 %      Temp src Heart Rate Source Patient Position BP Location FiO2 (%)   08/24/24 0120 08/23/24 2203 08/23/24 2111 08/23/24 2111 --   Oral Monitor Sitting Right arm        Physical Exam  GENERAL: alert, no acute distress  SKIN: Warm, dry  HENT: Normocephalic, atraumatic  EYES: no scleral icterus  CV: regular rhythm, regular rate  RESPIRATORY: normal effort, lungs clear  ABDOMEN: soft, nontender, nondistended  MUSCULOSKELETAL: no deformity  NEURO: alert, moves all extremities, follows commands                                                               LAB RESULTS  Recent Results (from the past 24 hour(s))   Comprehensive Metabolic Panel    Collection Time: 08/23/24 10:04 PM    Specimen: Blood   Result Value Ref Range    Glucose 156 (H) 65 - 99 mg/dL    BUN 2 (L) 6 - 20 mg/dL    Creatinine 0.94 0.76 - 1.27 mg/dL    Sodium 118 (C) 136 - 145 mmol/L    Potassium 4.0 3.5 - 5.2 mmol/L    Chloride 84 (L) 98 - 107 mmol/L    CO2 16.5 (L) 22.0 - 29.0 mmol/L    Calcium 8.9 8.6 - 10.5 mg/dL    Total Protein 7.0 6.0 - 8.5 g/dL    Albumin 4.9 3.5 - 5.2 g/dL    ALT (SGPT) 23 1 - 41 U/L    AST (SGOT) 59 (H) 1 - 40 U/L    Alkaline Phosphatase 58 39 - 117 U/L    Total Bilirubin 1.3 (H) 0.0 - 1.2 mg/dL    Globulin 2.1 gm/dL    A/G Ratio 2.3 g/dL    BUN/Creatinine Ratio 2.1 (L) 7.0 - 25.0    Anion Gap 17.5 (H) 5.0 - 15.0 mmol/L    eGFR 103.8 >60.0 mL/min/1.73   CK    Collection Time: 08/23/24 10:04 PM    Specimen: Blood   Result Value Ref Range    Creatine Kinase 2,906 (H) 20 - 200 U/L   CBC Auto Differential    Collection Time: 08/23/24 10:04 PM    Specimen: Blood   Result Value Ref Range    WBC 15.58  (H) 3.40 - 10.80 10*3/mm3    RBC 4.54 4.14 - 5.80 10*6/mm3    Hemoglobin 14.0 13.0 - 17.7 g/dL    Hematocrit 41.1 37.5 - 51.0 %    MCV 90.5 79.0 - 97.0 fL    MCH 30.8 26.6 - 33.0 pg    MCHC 34.1 31.5 - 35.7 g/dL    RDW 12.4 12.3 - 15.4 %    RDW-SD 40.6 37.0 - 54.0 fl    MPV 10.2 6.0 - 12.0 fL    Platelets 189 140 - 450 10*3/mm3    Neutrophil % 91.6 (H) 42.7 - 76.0 %    Lymphocyte % 3.9 (L) 19.6 - 45.3 %    Monocyte % 3.9 (L) 5.0 - 12.0 %    Eosinophil % 0.1 (L) 0.3 - 6.2 %    Basophil % 0.1 0.0 - 1.5 %    Immature Grans % 0.4 0.0 - 0.5 %    Neutrophils, Absolute 14.28 (H) 1.70 - 7.00 10*3/mm3    Lymphocytes, Absolute 0.60 (L) 0.70 - 3.10 10*3/mm3    Monocytes, Absolute 0.60 0.10 - 0.90 10*3/mm3    Eosinophils, Absolute 0.01 0.00 - 0.40 10*3/mm3    Basophils, Absolute 0.02 0.00 - 0.20 10*3/mm3    Immature Grans, Absolute 0.07 (H) 0.00 - 0.05 10*3/mm3    nRBC 0.0 0.0 - 0.2 /100 WBC   ECG 12 Lead Syncope    Collection Time: 08/23/24 10:08 PM   Result Value Ref Range    QT Interval 347 ms    QTC Interval 434 ms   Sodium, Urine, Random - Urine, Clean Catch    Collection Time: 08/24/24 12:38 AM    Specimen: Urine, Clean Catch   Result Value Ref Range    Sodium, Urine 36 mmol/L   Creatinine Urine Random (kidney function) GFR component - Urine, Clean Catch    Collection Time: 08/24/24 12:38 AM    Specimen: Urine, Clean Catch   Result Value Ref Range    Creatinine, Urine 40.7 mg/dL   Osmolality, Urine - Urine, Clean Catch    Collection Time: 08/24/24 12:38 AM    Specimen: Urine, Clean Catch   Result Value Ref Range    Osmolality, Urine 348 mOsm/kg   Urinalysis With Microscopic If Indicated (No Culture) - Urine, Clean Catch    Collection Time: 08/24/24 12:39 AM    Specimen: Urine, Clean Catch   Result Value Ref Range    Color, UA Yellow Yellow, Straw    Appearance, UA Clear Clear    pH, UA 6.0 5.0 - 8.0    Specific Gravity, UA 1.010 1.005 - 1.030    Glucose, UA Negative Negative    Ketones, UA 80 mg/dL (3+) (A) Negative     Bilirubin, UA Negative Negative    Blood, UA Negative Negative    Protein, UA Negative Negative    Leuk Esterase, UA Negative Negative    Nitrite, UA Negative Negative    Urobilinogen, UA 0.2 E.U./dL 0.2 - 1.0 E.U./dL   Osmolality, Serum    Collection Time: 08/24/24  3:54 AM    Specimen: Blood   Result Value Ref Range    Osmolality 249 (L) 275 - 300 mOsm/kg   Uric Acid    Collection Time: 08/24/24  3:54 AM    Specimen: Blood   Result Value Ref Range    Uric Acid 5.3 3.4 - 7.0 mg/dL   TSH    Collection Time: 08/24/24  3:54 AM    Specimen: Blood   Result Value Ref Range    TSH 1.350 0.270 - 4.200 uIU/mL   CBC (No Diff)    Collection Time: 08/24/24  6:05 AM    Specimen: Blood   Result Value Ref Range    WBC 10.07 3.40 - 10.80 10*3/mm3    RBC 4.13 (L) 4.14 - 5.80 10*6/mm3    Hemoglobin 12.5 (L) 13.0 - 17.7 g/dL    Hematocrit 36.3 (L) 37.5 - 51.0 %    MCV 87.9 79.0 - 97.0 fL    MCH 30.3 26.6 - 33.0 pg    MCHC 34.4 31.5 - 35.7 g/dL    RDW 12.3 12.3 - 15.4 %    RDW-SD 39.3 37.0 - 54.0 fl    MPV 10.1 6.0 - 12.0 fL    Platelets 168 140 - 450 10*3/mm3   Basic Metabolic Panel    Collection Time: 08/24/24  6:05 AM    Specimen: Blood   Result Value Ref Range    Glucose 106 (H) 65 - 99 mg/dL    BUN 11 6 - 20 mg/dL    Creatinine 0.71 (L) 0.76 - 1.27 mg/dL    Sodium 121 (L) 136 - 145 mmol/L    Potassium 3.9 3.5 - 5.2 mmol/L    Chloride 90 (L) 98 - 107 mmol/L    CO2 18.7 (L) 22.0 - 29.0 mmol/L    Calcium 8.4 (L) 8.6 - 10.5 mg/dL    BUN/Creatinine Ratio 15.5 7.0 - 25.0    Anion Gap 12.3 5.0 - 15.0 mmol/L    eGFR 117.5 >60.0 mL/min/1.73   CK    Collection Time: 08/24/24  6:05 AM    Specimen: Blood   Result Value Ref Range    Creatine Kinase 3,157 (H) 20 - 200 U/L       I ordered the above labs and independently reviewed the results.        RADIOLOGY  No Radiology Exams Resulted Within Past 24 Hours    I ordered the above noted radiological studies. Reviewed by me and discussed with radiologist.  See dictation for official radiology  interpretation.      PROCEDURES    Procedures      MEDICATIONS GIVEN IN ER    Medications   sodium chloride 0.9 % flush 10 mL (has no administration in time range)   nitroglycerin (NITROSTAT) SL tablet 0.4 mg (has no administration in time range)   acetaminophen (TYLENOL) tablet 650 mg (has no administration in time range)     Or   acetaminophen (TYLENOL) 160 MG/5ML oral solution 650 mg (has no administration in time range)     Or   acetaminophen (TYLENOL) suppository 650 mg (has no administration in time range)   sennosides-docusate (PERICOLACE) 8.6-50 MG per tablet 2 tablet (has no administration in time range)     And   polyethylene glycol (MIRALAX) packet 17 g (has no administration in time range)     And   bisacodyl (DULCOLAX) EC tablet 5 mg (has no administration in time range)     And   bisacodyl (DULCOLAX) suppository 10 mg (has no administration in time range)   ondansetron ODT (ZOFRAN-ODT) disintegrating tablet 4 mg (has no administration in time range)     Or   ondansetron (ZOFRAN) injection 4 mg (has no administration in time range)   aluminum-magnesium hydroxide-simethicone (MAALOX MAX) 400-400-40 MG/5ML suspension 15 mL (has no administration in time range)   sodium chloride 0.9 % infusion (75 mL/hr Intravenous New Bag 8/24/24 0605)   sodium chloride 0.9 % bolus 1,000 mL (0 mL Intravenous Stopped 8/24/24 0103)         ORDERS PLACED DURING THIS VISIT:  Orders Placed This Encounter   Procedures    Comprehensive Metabolic Panel    CK    CBC Auto Differential    Urinalysis With Microscopic If Indicated (No Culture) - Urine, Clean Catch    Sodium, Urine, Random - Urine, Clean Catch    Creatinine Urine Random (kidney function) GFR component - Urine, Clean Catch    Osmolality, Urine - Urine, Clean Catch    Osmolality, Serum    Uric Acid    TSH    CBC (No Diff)    Basic Metabolic Panel    CK    Diet: Regular/House; Fluid Consistency: Thin (IDDSI 0)    Vital Signs    Intake & Output    Oral Care    Place  Sequential Compression Device    Maintain Sequential Compression Device    Maintain IV Access    Telemetry - Place Orders & Notify Provider of Results When Patient Experiences Acute Chest Pain, Dysrhythmia or Respiratory Distress    May Be Off Telemetry for Tests    Up With Assistance    Nephrology consult noted, will see this AM  Nursing Communication    Code Status and Medical Interventions: CPR (Attempt to Resuscitate); Full Support    [facility] (on-call MD unless specified) Details    Inpatient Nephrology Consult    PT Consult: Eval & Treat Functional Mobility Below Baseline    Incentive Spirometry    ECG 12 Lead Syncope    Telemetry Scan    Telemetry Scan    Telemetry Scan    Telemetry Scan    Telemetry Scan    Telemetry Scan    Inpatient Admission    CBC & Differential         PROGRESS, DATA ANALYSIS, CONSULTS, AND MEDICAL DECISION MAKING    All labs have been independently interpreted by me.  All radiology studies have been reviewed by me and discussed with radiologist dictating the report.   EKG's independently viewed and interpreted by me.  Discussion below represents my analysis of pertinent findings related to patient's condition, differential diagnosis, treatment plan and final disposition.    Differential diagnosis includes but is not limited to dehydration, hyponatremia, hypokalemia, acidosis.    Clinical Scores:              ED Course as of 08/24/24 0718   Fri Aug 23, 2024   2159 21:59 EDT  ED first look: Patient states he was working out in the heat for 6 hours today.  Patient states he was sweating quite a bit.  Did drink a lot of water.  Has had some headache.  Has had some cramping in his legs.  Has had no vomiting but has had some nausea.  No abdominal pain.  Labs IV fluids ordered for oncoming physician [SL]   2341 Creatine Kinase(!): 2,906 [TJ]   2341 Sodium(!!): 118 [TJ]   2341 Creatinine: 0.94 [TJ]   2341 BUN(!): 2 [TJ]   2341 Anion Gap(!): 17.5 [TJ]   2341 WBC(!): 15.58 [TJ]   2341 Hemoglobin:  14.0 [TJ]   2341 Platelets: 189 [TJ]      ED Course User Index  [SL] Gato Tafoya MD  [TJ] Alvin Celaya MD         Critical care provider statement:    Critical care time (minutes): 31.   Critical care time was exclusive of:  Separately billable procedures and treating other patients   Critical care was necessary to treat or prevent imminent or life-threatening deterioration of the following conditions:  Multi-Organ Failure   Critical care was time spent personally by me on the following activities:  Development of treatment plan with patient or surrogate, discussions with consultants, evaluation of patient's response to treatment, examination of patient, obtaining history from patient or surrogate, ordering and performing treatments and interventions, ordering and review of laboratory studies, ordering and review of radiographic studies, pulse oximetry, re-evaluation of patient's condition and review of old charts.      PPE: The patient wore a mask and I wore an N95 mask throughout the entire patient encounter.       AS OF 07:18 EDT VITALS:    BP - 109/67  HR - 83  TEMP - 99 °F (37.2 °C) (Oral)  O2 SATS - 97%        DIAGNOSIS  Final diagnoses:   Hyponatremia   Non-traumatic rhabdomyolysis         DISPOSITION  ED Disposition       ED Disposition   Decision to Admit    Condition   --    Comment   Level of Care: Telemetry [5]   Diagnosis: Hyponatremia [906217]   Admitting Physician: UMESH ALVARADO [[id number]]   Certification: I Certify That Inpatient Hospital Services Are Medically Necessary For Greater Than 2 Midnights                    Note Disclaimer: At [facility], we believe that sharing information builds trust and better relationships. You are receiving this note because you recently visited [facility]. It is possible you will see health information before a provider has talked with you about it. This kind of information can be easy to misunderstand. To help you fully understand  what it means for your health, we urge you to discuss this note with your provider.         Alvin Celaya MD  08/24/24 0748

## 2024-08-24 NOTE — ED TRIAGE NOTES
Pt to ED with complaints of weakness- spent 6+ hours outside today doing yard work. Tingling, hot flashes, headache

## 2024-08-24 NOTE — THERAPY DISCHARGE NOTE
Patient Name: Justin San  : 1980    MRN: [id number]                              Today's Date: 2024       Admit Date: 2024    Visit Dx:     ICD-10-CM ICD-9-CM   1. Hyponatremia  E87.1 276.1   2. Non-traumatic rhabdomyolysis  M62.82 728.88     Patient Active Problem List   Diagnosis    Hyponatremia    Elevated CK    Rhabdomyolysis    Anemia    Leukocytosis    Dehydration    Hyperglycemia    Generalized weakness    Myalgia     History reviewed. No pertinent past medical history.  Past Surgical History:   Procedure Laterality Date    TONSILLECTOMY        General Information       Row Name 24 1149          Physical Therapy Time and Intention    Document Type discharge evaluation/summary  -MS     Mode of Treatment physical therapy;individual therapy  -MS       Row Name 24 1149          General Information    Patient Profile Reviewed yes  -MS     Prior Level of Function independent:  -MS     Barriers to Rehab none identified  -MS       Row Name 24 1149          Cognition    Orientation Status (Cognition) oriented x 3  -MS       Row Name 24 1149          Safety Issues, Functional Mobility    Comment, Safety Issues/Impairments (Mobility) Gait belt used for safety.  -MS               User Key  (r) = Recorded By, (t) = Taken By, (c) = Cosigned By      Initials Name Provider Type    MS Rc Kirkpatrick, PT Physical Therapist                   Mobility       Row Name 24 1149          Bed Mobility    Bed Mobility supine-sit;sit-supine  -MS     Supine-Sit [place] (Bed Mobility) independent  -MS     Sit-Supine [place] (Bed Mobility) independent  -MS       Row Name 24 1149          Sit-Stand Transfer    Sit-Stand [place] (Transfers) independent  -MS       Row Name 24 1149          Gait/Stairs (Locomotion)    [place] Level (Gait) independent  -MS     Distance in Feet (Gait) 120  -MS     Comment, (Gait/Stairs) No balance deficits noted.  -MS                User Key  (r) = Recorded By, (t) = Taken By, (c) = Cosigned By      Initials Name Provider Type    Rc Marin, PT Physical Therapist                   Obj/Interventions       Row Name 08/24/24 1149          Range of Motion Comprehensive    Comment, General Range of Motion BUE/LE (WFL's)  -MS       Row Name 08/24/24 1149          Strength Comprehensive (MMT)    Comment, General Manual Muscle Testing (MMT) Assessment BUE/LE (4/5)  -MS       [facility] Name 08/24/24 1149          Motor Skills    Therapeutic Exercise --  BLE standing ther. ex. program x 10 reps completed (heel raises, mini-squats)  -MS               User Key  (r) = Recorded By, (t) = Taken By, (c) = Cosigned By      Initials Name Provider Type    Rc Marin, PT Physical Therapist                   Goals/Plan    No documentation.                  Clinical Impression       Row Name 08/24/24 1150          Pain    Pretreatment Pain Rating 0/10 - no pain  -MS     Posttreatment Pain Rating 0/10 - no pain  -MS       Row Name 08/24/24 1150          Plan of Care Review    Plan of Care Reviewed With patient;family  -MS       [facility] Name 08/24/24 1150          Therapy Assessment/Plan (PT)    Criteria for Skilled Interventions Met (PT) no problems identified which require skilled intervention  -MS       [facility] Name 08/24/24 1150          Positioning and Restraints    Pre-Treatment Position in bed  -MS     Post Treatment Position bed  -MS     In Bed notified nsg;supine;call light within reach;encouraged to call for assist;exit alarm on;with family/caregiver  -MS               User Key  (r) = Recorded By, (t) = Taken By, (c) = Cosigned By      Initials Name Provider Type    Rc Marin, PT Physical Therapist                   Outcome Measures       Row Name 08/24/24 1150 08/24/24 0856       How much help from another person do you currently need...    Turning from your back to your side while in flat bed without using bedrails? 4  -MS 4   -JL    Moving from lying on back to sitting on the side of a flat bed without bedrails? 4  -MS 4  -JL    Moving to and from a bed to a chair (including a wheelchair)? 4  -MS 4  -JL    Standing up from a chair using your arms (e.g., wheelchair, bedside chair)? 4  -MS 4  -JL    Climbing 3-5 steps with a railing? 4  -MS 3  -JL    To walk in hospital room? 4  -MS 3  -JL    AM-PAC 6 Clicks Score (PT) 24  -MS 22  -JL    Highest Level of Mobility Goal 8 --> Walked 250 feet or more  -MS 7 --> Walk 25 feet or more  -JL      Row Name 08/24/24 0121          How much help from another person do you currently need...    Turning from your back to your side while in flat bed without using bedrails? 4  -KH     Moving from lying on back to sitting on the side of a flat bed without bedrails? 4  -KH     Moving to and from a bed to a chair (including a wheelchair)? 4  -KH     Standing up from a chair using your arms (e.g., wheelchair, bedside chair)? 4  -KH     Climbing 3-5 steps with a railing? 3  -KH     To walk in hospital room? 3  -KH     AM-PAC 6 Clicks Score (PT) 22  -KH     Highest Level of Mobility Goal 7 --> Walk 25 feet or more  -KH       Row Name 08/24/24 1150          Functional Assessment    Outcome Measure Options AM-PAC 6 Clicks Basic Mobility (PT)  -MS               User Key  (r) = Recorded By, (t) = Taken By, (c) = Cosigned By      Initials Name Provider Type    Rc Marin, PT Physical Therapist    Gregg Quigley, RN Registered Nurse    Kari Joshi, RN Registered Nurse                  Physical Therapy Education       Title: PT OT SLP Therapies (Resolved)       Topic: Physical Therapy (Resolved)       Point: Mobility training (Resolved)       Learning Progress Summary             Patient Acceptance, D,E, VU,DU by MS at 8/24/2024 1150                         Point: Home exercise program (Resolved)       Learning Progress Summary             Patient Acceptance, D,E, VU,DU by MS at 8/24/2024 1150                          Point: Body mechanics (Resolved)       Learning Progress Summary             Patient Acceptance, D,E, VU,DU by MS at 8/24/2024 1150                         Point: Precautions (Resolved)       Learning Progress Summary             Patient Acceptance, D,E, VU,DU by MS at 8/24/2024 1150                                         User Key       Initials Effective Dates Name Provider Type Discipline    MS 06/16/21 -  Rc Kirkpatrick PT Physical Therapist PT                  PT Recommendation and Plan     Plan of Care Reviewed With: patient  Outcome Evaluation: Pt. is currently independent with functional mobility and has no further questions/concerns regarding functional mobility or home safety.  Encouraged pt. to continue ambulation with nursing staff while here in the hospital.  Otherwise, P.T. will sign off.     Time Calculation:         PT Charges       Row Name 08/24/24 1151             Time Calculation    Start Time 1000  -MS      Stop Time 1012  -MS      Time Calculation (min) 12 min  -MS      PT Received On 08/24/24  -MS         Time Calculation- PT    Total Timed Code Minutes- PT 11 minute(s)  -MS                User Key  (r) = Recorded By, (t) = Taken By, (c) = Cosigned By      Initials Name Provider Type    MS Rc Kirkpatrick, PT Physical Therapist                  Therapy Charges for Today       Code Description Service Date Service Provider Modifiers Qty    22951272054 HC PT EVAL LOW COMPLEXITY 1 8/24/2024 Rc Kirkpatrick, PT GP 1            PT G-Codes  Outcome Measure Options: AM-PAC 6 Clicks Basic Mobility (PT)  AM-PAC 6 Clicks Score (PT): 24    PT Discharge Summary  Anticipated Discharge Disposition (PT): home  Reason for Discharge: Independent, At baseline function  Discharge Destination: Home    Rc Kirkpatrick PT  8/24/2024

## 2024-08-25 LAB
ALBUMIN SERPL-MCNC: 4 G/DL (ref 3.5–5.2)
ALBUMIN/GLOB SERPL: 2.2 G/DL
ALP SERPL-CCNC: 47 U/L (ref 39–117)
ALT SERPL W P-5'-P-CCNC: 21 U/L (ref 1–41)
ANION GAP SERPL CALCULATED.3IONS-SCNC: 10 MMOL/L (ref 5–15)
ANION GAP SERPL CALCULATED.3IONS-SCNC: 7 MMOL/L (ref 5–15)
ANION GAP SERPL CALCULATED.3IONS-SCNC: 7 MMOL/L (ref 5–15)
AST SERPL-CCNC: 45 U/L (ref 1–40)
BASOPHILS # BLD AUTO: 0.02 10*3/MM3 (ref 0–0.2)
BASOPHILS NFR BLD AUTO: 0.4 % (ref 0–1.5)
BILIRUB SERPL-MCNC: 0.4 MG/DL (ref 0–1.2)
BUN SERPL-MCNC: 10 MG/DL (ref 6–20)
BUN/CREAT SERPL: 11.4 (ref 7–25)
BUN/CREAT SERPL: 13.3 (ref 7–25)
BUN/CREAT SERPL: 13.3 (ref 7–25)
CALCIUM SPEC-SCNC: 8.4 MG/DL (ref 8.6–10.5)
CALCIUM SPEC-SCNC: 8.4 MG/DL (ref 8.6–10.5)
CALCIUM SPEC-SCNC: 8.5 MG/DL (ref 8.6–10.5)
CHLORIDE SERPL-SCNC: 104 MMOL/L (ref 98–107)
CHLORIDE SERPL-SCNC: 104 MMOL/L (ref 98–107)
CHLORIDE SERPL-SCNC: 98 MMOL/L (ref 98–107)
CK SERPL-CCNC: 1391 U/L (ref 20–200)
CO2 SERPL-SCNC: 21 MMOL/L (ref 22–29)
CREAT SERPL-MCNC: 0.75 MG/DL (ref 0.76–1.27)
CREAT SERPL-MCNC: 0.75 MG/DL (ref 0.76–1.27)
CREAT SERPL-MCNC: 0.88 MG/DL (ref 0.76–1.27)
DEPRECATED RDW RBC AUTO: 41.1 FL (ref 37–54)
EGFRCR SERPLBLD CKD-EPI 2021: 110.1 ML/MIN/1.73
EGFRCR SERPLBLD CKD-EPI 2021: 115.5 ML/MIN/1.73
EGFRCR SERPLBLD CKD-EPI 2021: 115.5 ML/MIN/1.73
EOSINOPHIL # BLD AUTO: 0.07 10*3/MM3 (ref 0–0.4)
EOSINOPHIL NFR BLD AUTO: 1.4 % (ref 0.3–6.2)
ERYTHROCYTE [DISTWIDTH] IN BLOOD BY AUTOMATED COUNT: 12.4 % (ref 12.3–15.4)
GLOBULIN UR ELPH-MCNC: 1.8 GM/DL
GLUCOSE SERPL-MCNC: 108 MG/DL (ref 65–99)
GLUCOSE SERPL-MCNC: 120 MG/DL (ref 65–99)
GLUCOSE SERPL-MCNC: 120 MG/DL (ref 65–99)
HCT VFR BLD AUTO: 36.5 % (ref 37.5–51)
HGB BLD-MCNC: 12.5 G/DL (ref 13–17.7)
IMM GRANULOCYTES # BLD AUTO: 0.01 10*3/MM3 (ref 0–0.05)
IMM GRANULOCYTES NFR BLD AUTO: 0.2 % (ref 0–0.5)
LYMPHOCYTES # BLD AUTO: 1.28 10*3/MM3 (ref 0.7–3.1)
LYMPHOCYTES NFR BLD AUTO: 25 % (ref 19.6–45.3)
MCH RBC QN AUTO: 30.9 PG (ref 26.6–33)
MCHC RBC AUTO-ENTMCNC: 34.2 G/DL (ref 31.5–35.7)
MCV RBC AUTO: 90.1 FL (ref 79–97)
MONOCYTES # BLD AUTO: 0.72 10*3/MM3 (ref 0.1–0.9)
MONOCYTES NFR BLD AUTO: 14 % (ref 5–12)
NEUTROPHILS NFR BLD AUTO: 3.03 10*3/MM3 (ref 1.7–7)
NEUTROPHILS NFR BLD AUTO: 59 % (ref 42.7–76)
NRBC BLD AUTO-RTO: 0 /100 WBC (ref 0–0.2)
PLATELET # BLD AUTO: 157 10*3/MM3 (ref 140–450)
PMV BLD AUTO: 10.6 FL (ref 6–12)
POTASSIUM SERPL-SCNC: 4.2 MMOL/L (ref 3.5–5.2)
POTASSIUM SERPL-SCNC: 4.5 MMOL/L (ref 3.5–5.2)
POTASSIUM SERPL-SCNC: 4.5 MMOL/L (ref 3.5–5.2)
PROT SERPL-MCNC: 5.8 G/DL (ref 6–8.5)
RBC # BLD AUTO: 4.05 10*6/MM3 (ref 4.14–5.8)
SODIUM SERPL-SCNC: 126 MMOL/L (ref 136–145)
SODIUM SERPL-SCNC: 129 MMOL/L (ref 136–145)
SODIUM SERPL-SCNC: 132 MMOL/L (ref 136–145)
SODIUM SERPL-SCNC: 132 MMOL/L (ref 136–145)
WBC NRBC COR # BLD AUTO: 5.13 10*3/MM3 (ref 3.4–10.8)

## 2024-08-25 PROCEDURE — 82550 ASSAY OF CK (CPK): CPT | Performed by: STUDENT IN AN ORGANIZED HEALTH CARE EDUCATION/TRAINING PROGRAM

## 2024-08-25 PROCEDURE — 25010000002 DESMOPRESSIN ACETATE PF 4 MCG/ML SOLUTION: Performed by: INTERNAL MEDICINE

## 2024-08-25 PROCEDURE — 0 DEXTROSE 5 % SOLUTION: Performed by: INTERNAL MEDICINE

## 2024-08-25 PROCEDURE — 80053 COMPREHEN METABOLIC PANEL: CPT | Performed by: INTERNAL MEDICINE

## 2024-08-25 PROCEDURE — 84295 ASSAY OF SERUM SODIUM: CPT | Performed by: INTERNAL MEDICINE

## 2024-08-25 PROCEDURE — 85025 COMPLETE CBC W/AUTO DIFF WBC: CPT | Performed by: STUDENT IN AN ORGANIZED HEALTH CARE EDUCATION/TRAINING PROGRAM

## 2024-08-25 RX ORDER — DESMOPRESSIN ACETATE 4 UG/ML
2 INJECTION, SOLUTION INTRAVENOUS; SUBCUTANEOUS ONCE
Status: COMPLETED | OUTPATIENT
Start: 2024-08-25 | End: 2024-08-25

## 2024-08-25 RX ADMIN — DESMOPRESSIN ACETATE 2 MCG: 4 INJECTION, SOLUTION INTRAVENOUS; SUBCUTANEOUS at 00:03

## 2024-08-25 RX ADMIN — DESMOPRESSIN ACETATE 2 MCG: 4 INJECTION, SOLUTION INTRAVENOUS; SUBCUTANEOUS at 08:23

## 2024-08-25 RX ADMIN — DEXTROSE MONOHYDRATE 200 ML/HR: 50 INJECTION, SOLUTION INTRAVENOUS at 04:37

## 2024-08-25 RX ADMIN — DEXTROSE MONOHYDRATE 150 ML/HR: 50 INJECTION, SOLUTION INTRAVENOUS at 10:22

## 2024-08-25 NOTE — PLAN OF CARE
Goal Outcome Evaluation:  Plan of Care Reviewed With: patient        Progress: improving  Outcome Evaluation: Sodium in physician's desired level.  Fluids stopped at this time.  Discharge is pending NA level results at 4pm lab draw

## 2024-08-25 NOTE — PROGRESS NOTES
Na over corrected to 131 yesterday 2PM, switched NS to D5W 150 cc/hr, but was up further to 135 at 10PM, so gave DDAVP 2 mcg IV x1, inc'd D5W 200 cc/hr . Na down slightly 132 this AM.  Will repeat DDAVP and continue D5W 150 cc/hr for now.  Repeat BMP at noon.  D/W Dr Cooper.

## 2024-08-25 NOTE — NURSING NOTE
Notified Dr. Franks regarding sodium level recheck result of 135. Orders given to increase rate of IVF and to give a 1x dose of IV Desmopressin, see MAR.   Sodium to be rechecked at 0400 and to call MD if sodium results are >135 or <125 per Dr. Franks.

## 2024-08-25 NOTE — PROGRESS NOTES
Name: Justin San ADMIT: 2024   : 1980  PCP: Provider, No Known    MRN: [id number] LOS: 2 days   AGE/SEX: 44 y.o. male  ROOM: [facility]     Subjective   Subjective   Patient seen and examined this morning.  Hospital day 2.  He is awake and alert, resting comfortably in bed.  Wife present at bedside.  He states that he feels much better today when compared to prior.       Objective   Objective   Vital Signs  Temp:  [98.4 °F (36.9 °C)-98.8 °F (37.1 °C)] 98.4 °F (36.9 °C)  Heart Rate:  [] 83  Resp:  [16-18] 16  BP: (103-119)/(66-77) 104/68  SpO2:  [99 %-100 %] 99 %  on   ;   Device (Oxygen Therapy): room air  Body mass index is 22.71 kg/m².  Physical Exam  Vitals and nursing note reviewed.   Constitutional:       General: He is awake. He is not in acute distress.  Cardiovascular:      Rate and Rhythm: Normal rate and regular rhythm.      Pulses: Normal pulses.      Heart sounds: Normal heart sounds.   Pulmonary:      Effort: Pulmonary effort is normal. No respiratory distress.      Breath sounds: Normal breath sounds.   Abdominal:      Palpations: Abdomen is soft.      Tenderness: There is no abdominal tenderness.   Skin:     General: Skin is warm and dry.   Neurological:      General: No focal deficit present.      Mental Status: He is alert and oriented to person, place, and time.   Psychiatric:         Behavior: Behavior is cooperative.       Results Review     I reviewed the patient's new clinical results.  Results from last 7 days   Lab Units 24  0436 24  0605 24  2204   WBC 10*3/mm3 5.13 10.07 15.58*   HEMOGLOBIN g/dL 12.5* 12.5* 14.0   PLATELETS 10*3/mm3 157 168 189     Results from last 7 days   Lab Units 24  0436 24  2232 24  1419 24  0605   SODIUM mmol/L 132*  132* 135* 131* 121*   POTASSIUM mmol/L 4.5  4.5 4.5 4.2 3.9   CHLORIDE mmol/L 104  104 106 99 90*   CO2 mmol/L 21.0*  21.0* 22.6 21.0* 18.7*   BUN mg/dL 10  10 12 10 11  "  CREATININE mg/dL 0.75*  0.75* 0.80 0.74* 0.71*   GLUCOSE mg/dL 120*  120* 108* 103* 106*   EGFR mL/min/1.73 115.5  115.5 113.3 116.0 117.5     Results from last 7 days   Lab Units 08/25/24  0436 08/23/24  2204   ALBUMIN g/dL 4.0 4.9   BILIRUBIN mg/dL 0.4 1.3*   ALK PHOS U/L 47 58   AST (SGOT) U/L 45* 59*   ALT (SGPT) U/L 21 23     Results from last 7 days   Lab Units 08/25/24  0436 08/24/24  2232 08/24/24  1419 08/24/24  0605 08/23/24  2204   CALCIUM mg/dL 8.4*  8.4* 9.0 8.9 8.4* 8.9   ALBUMIN g/dL 4.0  --   --   --  4.9     Results from last 7 days   Lab Units 08/24/24  1419   LACTATE mmol/L 0.8     No results found for: \"HGBA1C\", \"POCGLU\"    No radiology results for the last day    I have personally reviewed all medications:  Scheduled Medications   Infusions  dextrose, 150 mL/hr, Last Rate: 150 mL/hr (08/25/24 1022)    Diet  Diet: Regular/House; Fluid Consistency: Thin (IDDSI 0)    I have personally reviewed:  [x]  Laboratory   []  Microbiology   []  Radiology   [x]  EKG/Telemetry  []  Cardiology/Vascular   []  Pathology    []  Records       Assessment/Plan     Active Hospital Problems    Diagnosis  POA    **Hyponatremia [E87.1]  Yes    Elevated CK [R74.8]  Yes    Rhabdomyolysis [M62.82]  Yes    Anemia [D64.9]  Yes    Leukocytosis [D72.829]  Yes    Dehydration [E86.0]  Yes    Hyperglycemia [R73.9]  Yes    Generalized weakness [R53.1]  Yes    Myalgia [M79.10]  Yes      Resolved Hospital Problems   No resolved problems to display.     Mr. San is a 44 y.o. male that presents to [facility] complaining of generalized weakness, fatigue, muscle aches, admitted with Hyponatremia.      Hyponatremia  - Na level noted to be low at 118 on arrival, urine sodium 36, urine osmolality 348, uric acid normal.  Nephrology consulted and following, initial sodium overcorrected, so he had to be given desmopressin and D5W, however, this has led to improvement in more acceptable levels on most recent testing.  " Most recent nephrology note reviewed, discussed with Dr. Franks (08/25) he is planning to repeat 1 more sodium this afternoon and based on that, we will determine ongoing management plans.  Continue treatments as prescribed for now, continue to follow nephrology plans and recommendations.     Generalized weakness  Myalgias  Elevated CK/Rhabdomyolysis  Dehydration  - CK elevated 2906, worsened to 3157 following arrival. UA with 3+ ketones, coupled with urine studies, likely reflective of dehydration.  Patient has received IVF, which is led to improvement in most recent CK levels.  continue treatments as prescribed for now, continue close monitoring.  Continue to trend CK levels while admitted.     Abnormal EKG  - Obtained following arrival, machine reading as atrial flutter, personally reviewed, appears to be some artifact but otherwise sinus rhythm, do not appreciate flutter waves. EKG later read by Cardiologist who noted sinus rhythm, so machine read was in error. He denies any chest pain, palpitations, dyspnea, etc to suggest cardiac etiology.  -Continue telemetry monitoring.     Leukocytosis  - WBC count elevated on arrival, likely reactive, values have since improved and normalized without antibiotic therapy. Patient afebrile, no other signs or symptoms to suggest acute infection at this time.  - Order repeat CBC in AM for reassessment.      Anemia  - Hemoglobin low on most recent labs, likely dilutional from IVF.  No evidence of overt blood loss. No indications for acute intervention at this time.    - Order repeat CBC in AM for reassessment. Continue to monitor, transfuse for hemoglobin <7.     Hyperglycemia  - Glucose elevated on most recent labs. Patient without known history of T2DM.  No prior A1c.  Monitor for now, if glucose remains elevated or becomes uncontrolled, can order A1c and pursue further management as indicated.         SCDs for DVT prophylaxis.  Full code.  Discussed with patient, family,  nursing staff, and consulting provider.  Anticipate discharge home  this afternoon or tomorrow based on Na levels  Expected discharge date/ time has not been documented.      Eliu Cooper DO  [facility]  08/25/24

## 2024-08-25 NOTE — PROGRESS NOTES
[facility] Progress Note      Patient Name: Justin San  : 1980  MRN: [id number]  Primary Care Physician:  Provider, No Known  Date of admission: 2024    Subjective     Interval History:   F/U hypoNa     Review of Systems:   No complaints ; eager to go home     Objective     Vitals:   Temp:  [98.4 °F (36.9 °C)-98.8 °F (37.1 °C)] 98.4 °F (36.9 °C)  Heart Rate:  [] 83  Resp:  [16-18] 16  BP: (103-119)/(66-77) 104/68    Intake/Output Summary (Last 24 hours) at 2024 0916  Last data filed at 2024 0819  Gross per 24 hour   Intake 2125 ml   Output 1300 ml   Net 825 ml       Physical Exam:    General Appearance: alert, comfortable   Neck: supple, no JVD  Lungs: CTA bilat no rales   Heart: RRR, normal S1 and S2  Abdomen: soft, nontender, nondistended   Extremities: no edema, cyanosis or clubbing     Scheduled Meds:        IV Meds:   dextrose, 150 mL/hr, Last Rate: 150 mL/hr (24 0819)        Results Reviewed:   I have personally reviewed the results from the time of this admission to 2024 09:16 EDT     Results from last 7 days   Lab Units 24  0436 24  2232 24  1419 24  0605 24  2204   SODIUM mmol/L 132*  132* 135* 131*   < > 118*   POTASSIUM mmol/L 4.5  4.5 4.5 4.2   < > 4.0   CHLORIDE mmol/L 104  104 106 99   < > 84*   CO2 mmol/L 21.0*  21.0* 22.6 21.0*   < > 16.5*   BUN mg/dL 10  10 12 10   < > 2*   CREATININE mg/dL 0.75*  0.75* 0.80 0.74*   < > 0.94   CALCIUM mg/dL 8.4*  8.4* 9.0 8.9   < > 8.9   BILIRUBIN mg/dL 0.4  --   --   --  1.3*   ALK PHOS U/L 47  --   --   --  58   ALT (SGPT) U/L 21  --   --   --  23   AST (SGOT) U/L 45*  --   --   --  59*   GLUCOSE mg/dL 120*  120* 108* 103*   < > 156*    < > = values in this interval not displayed.     Estimated Creatinine Clearance: 119.4 mL/min (A) (by C-G formula based on SCr of 0.75 mg/dL (L)).      Results from last 7 days   Lab Units 24  0354   URIC  ACID mg/dL 5.3     Results from last 7 days   Lab Units 08/25/24  0436 08/24/24  0605 08/23/24  2204   WBC 10*3/mm3 5.13 10.07 15.58*   HEMOGLOBIN g/dL 12.5* 12.5* 14.0   PLATELETS 10*3/mm3 157 168 189           Assessment / Plan     ASSESSMENT:  Hyponatremia - likely hypovolemic (but at same time was drinking large amounts of water while working outside in heat).  Na over corrected with NS  -> 135, switched to D5W and gave DDAVP, brought Na down slightly 132 and I repeated DDAVP this AM, am trying to keep overall correction < 16 meq/48h (thru 10PM tonight) ie keep Na < 135.    Elevated CK - moderate (~ 3K), related to intense yard work; renal fcn preserved.  CK down to 1400 with IVF   AG metabolic acidosis - ketonuria due to vol depletion; improved, bicarb 21, AG closed   Leukocytosis - reactive, WBC improved 15 to 10K to 5K  Hyperglycemia, mild,  -> 106 -> 120 now (on high volume D5W)    PLAN:  Repeat BMP noon  Continue D5W 150 cc/hr for now  Aim to keep Na < 135 by this evening  Likely can discharge later today; d/w Dr Cooper     Addendum: Na down further 129 with above measures at noon.  Will stop D5W.  Repeat Na in 4 hours and if stable then would not object to discharge this evening      Eleazar Franks MD  08/25/24  09:16 EDT    [facility]""  [phone]

## 2024-08-26 VITALS
BODY MASS INDEX: 22.76 KG/M2 | OXYGEN SATURATION: 100 % | HEART RATE: 75 BPM | DIASTOLIC BLOOD PRESSURE: 65 MMHG | SYSTOLIC BLOOD PRESSURE: 106 MMHG | WEIGHT: 145 LBS | HEIGHT: 67 IN | RESPIRATION RATE: 16 BRPM | TEMPERATURE: 98.2 F

## 2024-08-26 LAB
ALBUMIN SERPL-MCNC: 4.2 G/DL (ref 3.5–5.2)
ALBUMIN/GLOB SERPL: 2.2 G/DL
ALP SERPL-CCNC: 58 U/L (ref 39–117)
ALT SERPL W P-5'-P-CCNC: 135 U/L (ref 1–41)
ANION GAP SERPL CALCULATED.3IONS-SCNC: 8 MMOL/L (ref 5–15)
AST SERPL-CCNC: 129 U/L (ref 1–40)
BASOPHILS # BLD AUTO: 0.02 10*3/MM3 (ref 0–0.2)
BASOPHILS NFR BLD AUTO: 0.3 % (ref 0–1.5)
BILIRUB SERPL-MCNC: 0.5 MG/DL (ref 0–1.2)
BUN SERPL-MCNC: 11 MG/DL (ref 6–20)
BUN/CREAT SERPL: 16.2 (ref 7–25)
CALCIUM SPEC-SCNC: 8.7 MG/DL (ref 8.6–10.5)
CHLORIDE SERPL-SCNC: 93 MMOL/L (ref 98–107)
CK SERPL-CCNC: 793 U/L (ref 20–200)
CO2 SERPL-SCNC: 23 MMOL/L (ref 22–29)
CREAT SERPL-MCNC: 0.68 MG/DL (ref 0.76–1.27)
DEPRECATED RDW RBC AUTO: 40.5 FL (ref 37–54)
EGFRCR SERPLBLD CKD-EPI 2021: 119 ML/MIN/1.73
EOSINOPHIL # BLD AUTO: 0.2 10*3/MM3 (ref 0–0.4)
EOSINOPHIL NFR BLD AUTO: 2.8 % (ref 0.3–6.2)
ERYTHROCYTE [DISTWIDTH] IN BLOOD BY AUTOMATED COUNT: 12.4 % (ref 12.3–15.4)
GLOBULIN UR ELPH-MCNC: 1.9 GM/DL
GLUCOSE SERPL-MCNC: 92 MG/DL (ref 65–99)
HCT VFR BLD AUTO: 39.4 % (ref 37.5–51)
HGB BLD-MCNC: 13 G/DL (ref 13–17.7)
IMM GRANULOCYTES # BLD AUTO: 0.02 10*3/MM3 (ref 0–0.05)
IMM GRANULOCYTES NFR BLD AUTO: 0.3 % (ref 0–0.5)
LYMPHOCYTES # BLD AUTO: 1.61 10*3/MM3 (ref 0.7–3.1)
LYMPHOCYTES NFR BLD AUTO: 22.1 % (ref 19.6–45.3)
MCH RBC QN AUTO: 29.8 PG (ref 26.6–33)
MCHC RBC AUTO-ENTMCNC: 33 G/DL (ref 31.5–35.7)
MCV RBC AUTO: 90.4 FL (ref 79–97)
MONOCYTES # BLD AUTO: 0.71 10*3/MM3 (ref 0.1–0.9)
MONOCYTES NFR BLD AUTO: 9.8 % (ref 5–12)
NEUTROPHILS NFR BLD AUTO: 4.71 10*3/MM3 (ref 1.7–7)
NEUTROPHILS NFR BLD AUTO: 64.7 % (ref 42.7–76)
NRBC BLD AUTO-RTO: 0 /100 WBC (ref 0–0.2)
PLATELET # BLD AUTO: 174 10*3/MM3 (ref 140–450)
PMV BLD AUTO: 10.9 FL (ref 6–12)
POTASSIUM SERPL-SCNC: 4.8 MMOL/L (ref 3.5–5.2)
PROT SERPL-MCNC: 6.1 G/DL (ref 6–8.5)
RBC # BLD AUTO: 4.36 10*6/MM3 (ref 4.14–5.8)
SODIUM SERPL-SCNC: 124 MMOL/L (ref 136–145)
SODIUM SERPL-SCNC: 128 MMOL/L (ref 136–145)
SODIUM SERPL-SCNC: 138 MMOL/L (ref 136–145)
WBC NRBC COR # BLD AUTO: 7.27 10*3/MM3 (ref 3.4–10.8)

## 2024-08-26 PROCEDURE — 25810000003 SODIUM CHLORIDE 0.9 % SOLUTION: Performed by: INTERNAL MEDICINE

## 2024-08-26 PROCEDURE — 80053 COMPREHEN METABOLIC PANEL: CPT | Performed by: INTERNAL MEDICINE

## 2024-08-26 PROCEDURE — 85025 COMPLETE CBC W/AUTO DIFF WBC: CPT | Performed by: STUDENT IN AN ORGANIZED HEALTH CARE EDUCATION/TRAINING PROGRAM

## 2024-08-26 PROCEDURE — 84295 ASSAY OF SERUM SODIUM: CPT | Performed by: INTERNAL MEDICINE

## 2024-08-26 PROCEDURE — 82550 ASSAY OF CK (CPK): CPT | Performed by: STUDENT IN AN ORGANIZED HEALTH CARE EDUCATION/TRAINING PROGRAM

## 2024-08-26 RX ORDER — SODIUM CHLORIDE 1 G/1
1 TABLET ORAL
Status: DISCONTINUED | OUTPATIENT
Start: 2024-08-26 | End: 2024-08-26 | Stop reason: HOSPADM

## 2024-08-26 RX ADMIN — SODIUM CHLORIDE TAB 1 GM 1 G: 1 TAB at 09:11

## 2024-08-26 RX ADMIN — SODIUM CHLORIDE TAB 1 GM 1 G: 1 TAB at 12:20

## 2024-08-26 RX ADMIN — SODIUM CHLORIDE 500 ML: 9 INJECTION, SOLUTION INTRAVENOUS at 08:17

## 2024-08-26 NOTE — DISCHARGE SUMMARY
Date of Discharge:  8/26/2024    PCP: Provider, No Known    Discharge Diagnosis:   Active Hospital Problems    Diagnosis  POA    **Hyponatremia [E87.1]  Yes    Elevated CK [R74.8]  Yes    Rhabdomyolysis [M62.82]  Yes    Anemia [D64.9]  Yes    Leukocytosis [D72.829]  Yes    Dehydration [E86.0]  Yes    Hyperglycemia [R73.9]  Yes    Generalized weakness [R53.1]  Yes    Myalgia [M79.10]  Yes      Resolved Hospital Problems   No resolved problems to display.          Consults       Date and Time Order Name Status Description    8/23/2024 11:56 PM Inpatient Nephrology Consult Completed     8/23/2024 11:23 PM [facility] (on-call MD unless specified) Details            Hospital Course  44 y.o. male   Mr. San is a 44 y.o. male that presents to [facility] complaining of generalized weakness, fatigue, muscle aches, admitted with Hyponatremia.  His sodium upon arrival was noted to be 118.  Nephrology was consulted.  Sodium levels have now been corrected, patient has been cleared by nephrology for discharge.  His hospital stay was also complicated by generalized weakness, myalgias, rhabdomyolysis, dehydration, his clinical status appears to be close to baseline.  Patient also has other conditions which include leukocytosis, transaminitis, overall patient has continued to improve, he will be discharged home in a stable condition.  Total time spent on discharge management is more than 30 minutes.         Temp:  [98.2 °F (36.8 °C)-98.6 °F (37 °C)] 98.2 °F (36.8 °C)  Heart Rate:  [67-75] 75  Resp:  [16] 16  BP: (106-110)/(65-78) 106/65  Body mass index is 22.71 kg/m².    Physical Exam  General, awake and alert.  Head and ENT, normocephalic and atraumatic.  Lungs, symmetric expansion, equal air entry bilaterally.  Heart, regular rate and rhythm.  Abdomen, soft and nontender.  Extremities, no clubbing or cyanosis.  Neuro, no focal deficits.  Skin: Warm and no rash.  Psych, normal mood and affect.  Musculoskeletal, joint  examination is grossly normal.      Disposition: Home or Self Care       Discharge Medications        Continue These Medications        Instructions Start Date   Lidocaine Viscous HCl 2 % solution  Commonly known as: XYLOCAINE   10 mL, Oral, 4 Times Daily PRN                Additional Instructions for the Follow-ups that You Need to Schedule       Discharge Follow-up with PCP   As directed       Currently Documented PCP:    Provider, No Known    PCP Phone Number:    [phone]     Follow Up Details: Follow-up with PCP and nephrology in 7 to 10 days.               Follow-up Information       Provider, No Known .    Why: Follow-up with PCP and nephrology in 7 to 10 days.  Contact information:  [facility] [address]  [phone]                          No future appointments.     Maurisio Ford MD  [facility]  08/26/24    Discharge time spent greater than 30 minutes.

## 2024-08-26 NOTE — PROGRESS NOTES
Na down to 124 this AM, as DDAVP effect continues.  Will give  cc bolus over 2h.  Repeat Na 10 AM.  Start salt tabs.  If Na starts trending back up can still discharge today and I can see back in office later this week.  Will assess patient later.

## 2024-08-26 NOTE — PROGRESS NOTES
[facility] Progress Note      Patient Name: Justin San  : 1980  MRN: [id number]  Primary Care Physician:  Provider, No Known  Date of admission: 2024    Subjective     Interval History:   F/U hypoNa     Review of Systems:   Lying comfortably in bed. C/o mild HA. NAD.  Denies N/V/D. Appetite ok.   UOP not recorded, denies urinary symptoms.    Objective     Vitals:   Temp:  [98.2 °F (36.8 °C)-98.6 °F (37 °C)] 98.2 °F (36.8 °C)  Heart Rate:  [67-75] 75  Resp:  [16] 16  BP: (106-110)/(65-78) 106/65  No intake or output data in the 24 hours ending 24 1558      Physical Exam:    General Appearance: alert, comfortable   Neck: supple, no JVD  Lungs: CTA bilat no rales, RA   Heart: RRR, normal S1 and S2  Abdomen: soft, nontender, nondistended   Extremities: no edema, cyanosis or clubbing     Scheduled Meds:     sodium chloride, 1 g, Oral, TID With Meals      IV Meds:          Results Reviewed:   I have personally reviewed the results from the time of this admission to 2024 15:58 EDT     Results from last 7 days   Lab Units 24  0947 24  0255 24  1545 24  1152 24  0436 24  0605 24  2204   SODIUM mmol/L 128* 124* 126* 129* 132*  132*   < > 118*   POTASSIUM mmol/L  --  4.8  --  4.2 4.5  4.5   < > 4.0   CHLORIDE mmol/L  --  93*  --  98 104  104   < > 84*   CO2 mmol/L  --  23.0  --  21.0* 21.0*  21.0*   < > 16.5*   BUN mg/dL  --  11  --  10 10  10   < > 2*   CREATININE mg/dL  --  0.68*  --  0.88 0.75*  0.75*   < > 0.94   CALCIUM mg/dL  --  8.7  --  8.5* 8.4*  8.4*   < > 8.9   BILIRUBIN mg/dL  --  0.5  --   --  0.4  --  1.3*   ALK PHOS U/L  --  58  --   --  47  --  58   ALT (SGPT) U/L  --  135*  --   --  21  --  23   AST (SGOT) U/L  --  129*  --   --  45*  --  59*   GLUCOSE mg/dL  --  92  --  108* 120*  120*   < > 156*    < > = values in this interval not displayed.     Estimated Creatinine Clearance: 131.7 mL/min (A)  (by C-G formula based on SCr of 0.68 mg/dL (L)).      Results from last 7 days   Lab Units 08/24/24  0354   URIC ACID mg/dL 5.3     Results from last 7 days   Lab Units 08/26/24  0255 08/25/24  0436 08/24/24  0605 08/23/24  2204   WBC 10*3/mm3 7.27 5.13 10.07 15.58*   HEMOGLOBIN g/dL 13.0 12.5* 12.5* 14.0   PLATELETS 10*3/mm3 174 157 168 189           Assessment / Plan     ASSESSMENT:  Hyponatremia - likely hypovolemic (but at same time was drinking large amounts of water while working outside in heat).  Na over corrected with NS  -> 135, switched to D5W and gave DDAVP (x2), which ultimately lowered Na more than intended (to 124 this AM).  Gave NS bolus 500 cc and salt tabs and now we're up to 128.       Rhabdomyolysis, moderate (CK ~ 3K), related to intense yard work; renal fcn preserved.  CK stagnant ~ 800 and has mild transaminitis with inc in LFTs  AG metabolic acidosis - ketonuria due to vol depletion; now resolved   Leukocytosis - reactive, WBC now WNL  Hyperglycemia -resolved.     PLAN:  Na improved with NS bolus & salt tabs  Recheck Na now, if stable discharge today and follow up with me in office on FRI, will repeat CMP & CK then.  Discussed with Dr Ford & RN       Eleazar Franks MD  08/26/24  15:58 EDT    [facility]  [phone]

## 2024-08-26 NOTE — PROGRESS NOTES
Name: Justin San ADMIT: 2024   : 1980  PCP: Provider, No Known    MRN: [id number] LOS: 3 days   AGE/SEX: 44 y.o. male  ROOM: [facility]     Subjective   Subjective   Patient is seen at bedside, no new complaints.       Objective   Objective   Vital Signs  Temp:  [98.1 °F (36.7 °C)-98.6 °F (37 °C)] 98.4 °F (36.9 °C)  Heart Rate:  [67-71] 71  Resp:  [16] 16  BP: (108-110)/(72-78) 110/72  SpO2:  [99 %-100 %] 100 %  on   ;   Device (Oxygen Therapy): room air  Body mass index is 22.71 kg/m².  Physical Exam  General, awake and alert.  Head and ENT, normocephalic and atraumatic.  Lungs, symmetric expansion, equal air entry bilaterally.  Heart, regular rate and rhythm.  Abdomen, soft and nontender.  Extremities, no clubbing or cyanosis.  Neuro, no focal deficits.  Skin: Warm and no rash.  Psych, normal mood and affect.  Musculoskeletal, joint examination is grossly normal.      Results Review     I reviewed the patient's new clinical results.  Results from last 7 days   Lab Units 24  0255 24  0436 24  0605 24  2204   WBC 10*3/mm3 7.27 5.13 10.07 15.58*   HEMOGLOBIN g/dL 13.0 12.5* 12.5* 14.0   PLATELETS 10*3/mm3 174 157 168 189     Results from last 7 days   Lab Units 24  0255 24  1545 24  1152 24  0436 24  2232   SODIUM mmol/L 124* 126* 129* 132*  132* 135*   POTASSIUM mmol/L 4.8  --  4.2 4.5  4.5 4.5   CHLORIDE mmol/L 93*  --  98 104  104 106   CO2 mmol/L 23.0  --  21.0* 21.0*  21.0* 22.6   BUN mg/dL 11  --  10 10  10 12   CREATININE mg/dL 0.68*  --  0.88 0.75*  0.75* 0.80   GLUCOSE mg/dL 92  --  108* 120*  120* 108*   EGFR mL/min/1.73 119.0  --  110.1 115.5  115.5 113.3     Results from last 7 days   Lab Units 24  0255 24  0436 24  2204   ALBUMIN g/dL 4.2 4.0 4.9   BILIRUBIN mg/dL 0.5 0.4 1.3*   ALK PHOS U/L 58 47 58   AST (SGOT) U/L 129* 45* 59*   ALT (SGPT) U/L 135* 21 23     Results from last 7 days   Lab Units  "08/26/24  0255 08/25/24  1152 08/25/24  0436 08/24/24  2232 08/24/24  0605 08/23/24  2204   CALCIUM mg/dL 8.7 8.5* 8.4*  8.4* 9.0   < > 8.9   ALBUMIN g/dL 4.2  --  4.0  --   --  4.9    < > = values in this interval not displayed.     Results from last 7 days   Lab Units 08/24/24  1419   LACTATE mmol/L 0.8     No results found for: \"HGBA1C\", \"POCGLU\"    No radiology results for the last day    I have personally reviewed all medications:  Scheduled Medications  sodium chloride, 500 mL, Intravenous, Once  sodium chloride, 1 g, Oral, TID With Meals    Infusions   Diet  Diet: Regular/House, Fluid Restriction (240 mL/tray); 1500 mL/day; Fluid Consistency: Thin (IDDSI 0)    I have personally reviewed:  [x]  Laboratory   [x]  Microbiology   [x]  Radiology   [x]  EKG/Telemetry  [x]  Cardiology/Vascular   []  Pathology    []  Records       Assessment/Plan     Active Hospital Problems    Diagnosis  POA    **Hyponatremia [E87.1]  Yes    Elevated CK [R74.8]  Yes    Rhabdomyolysis [M62.82]  Yes    Anemia [D64.9]  Yes    Leukocytosis [D72.829]  Yes    Dehydration [E86.0]  Yes    Hyperglycemia [R73.9]  Yes    Generalized weakness [R53.1]  Yes    Myalgia [M79.10]  Yes      Resolved Hospital Problems   No resolved problems to display.       44 y.o. male admitted with Hyponatremia.    Mr. San is a 44 y.o. male that presents to [facility] complaining of generalized weakness, fatigue, muscle aches, admitted with Hyponatremia.      Hyponatremia  - Na level noted to be low at 118 on arrival, urine sodium 36, urine osmolality 348, uric acid normal.  Nephrology consulted and following, initial sodium overcorrected, so he had to be given desmopressin and D5W, however, this has led to improvement in more acceptable levels on most recent testing.  Sodium level dropped noted, it is 124, we will follow nephrology recommendations.     Generalized weakness  Myalgias  Elevated CK/Rhabdomyolysis  Dehydration  - CK elevated 2906, " worsened to 3157 following arrival. UA with 3+ ketones, coupled with urine studies, likely reflective of dehydration.  Patient has received IVF, which is led to improvement in most recent CK levels.  continue treatments as prescribed for now, continue close monitoring.  Continue to trend CK levels while admitted.     Abnormal EKG  - Obtained following arrival, machine reading as atrial flutter, personally reviewed, appears to be some artifact but otherwise sinus rhythm, do not appreciate flutter waves. EKG later read by Cardiologist who noted sinus rhythm, so machine read was in error. He denies any chest pain, palpitations, dyspnea, etc to suggest cardiac etiology.  -Continue telemetry monitoring.     Leukocytosis  - WBC count elevated on arrival, likely reactive, values have since improved and normalized without antibiotic therapy. Patient afebrile, no other signs or symptoms to suggest acute infection at this time.  - Order repeat CBC in AM for reassessment.      Anemia  - Hemoglobin low on most recent labs, likely dilutional from IVF.  No evidence of overt blood loss. No indications for acute intervention at this time.    - Order repeat CBC in AM for reassessment. Continue to monitor, transfuse for hemoglobin <7.     Hyperglycemia  - Glucose elevated on most recent labs. Patient without known history of T2DM.  No prior A1c.  Monitor for now, if glucose remains elevated or becomes uncontrolled, can order A1c and pursue further management as indicated.    Transaminitis  Etiology not clear at this point of time, we will monitor liver enzyme trend.  Repeat CMP in the morning.        SCDs for DVT prophylaxis.  Full code.  Discussed with patient, family, nursing staff, and consulting provider.      Maurisio Ford MD  [facility]  08/26/24  09:59 EDT

## 2024-08-27 NOTE — CASE MANAGEMENT/SOCIAL WORK
Case Management Discharge Note      Final Note: Home, no additional CCP needs.         Selected Continued Care - Discharged on 8/26/2024 Admission date: 8/23/2024 - Discharge disposition: Home or Self Care      Destination    No services have been selected for the patient.                Durable Medical Equipment    No services have been selected for the patient.                Dialysis/Infusion    No services have been selected for the patient.                Home Medical Care    No services have been selected for the patient.                Therapy    No services have been selected for the patient.                Community Resources    No services have been selected for the patient.                Community & DME    No services have been selected for the patient.                         Final Discharge Disposition Code: 01 - home or self-care